# Patient Record
Sex: MALE | Race: OTHER | HISPANIC OR LATINO | URBAN - METROPOLITAN AREA
[De-identification: names, ages, dates, MRNs, and addresses within clinical notes are randomized per-mention and may not be internally consistent; named-entity substitution may affect disease eponyms.]

---

## 2017-02-13 ENCOUNTER — OUTPATIENT (OUTPATIENT)
Dept: OUTPATIENT SERVICES | Facility: HOSPITAL | Age: 42
LOS: 1 days | Discharge: HOME | End: 2017-02-13

## 2017-02-13 ENCOUNTER — APPOINTMENT (OUTPATIENT)
Dept: INTERNAL MEDICINE | Facility: CLINIC | Age: 42
End: 2017-02-13

## 2017-02-13 VITALS
HEIGHT: 68 IN | WEIGHT: 180 LBS | BODY MASS INDEX: 27.28 KG/M2 | HEART RATE: 79 BPM | SYSTOLIC BLOOD PRESSURE: 129 MMHG | DIASTOLIC BLOOD PRESSURE: 71 MMHG

## 2017-02-13 DIAGNOSIS — R10.9 UNSPECIFIED ABDOMINAL PAIN: ICD-10-CM

## 2017-02-13 DIAGNOSIS — Z78.9 OTHER SPECIFIED HEALTH STATUS: ICD-10-CM

## 2017-02-13 DIAGNOSIS — Z00.00 ENCOUNTER FOR GENERAL ADULT MEDICAL EXAMINATION W/OUT ABNORMAL FINDINGS: ICD-10-CM

## 2017-02-17 DIAGNOSIS — E78.1 PURE HYPERGLYCERIDEMIA: ICD-10-CM

## 2017-03-03 ENCOUNTER — CLINICAL ADVICE (OUTPATIENT)
Age: 42
End: 2017-03-03

## 2017-03-03 PROBLEM — E78.1 HYPERTRIGLYCERIDEMIA: Status: ACTIVE | Noted: 2017-03-03

## 2017-03-03 LAB
CHOLEST SERPL-MCNC: 286 MG/DL
ESTIMATED AVERGAGE GLUCOSE (NORTH): 103 MG/DL
HBA1C MFR BLD: 5.2 %
HDLC SERPL-MCNC: 41 MG/DL
HDLC SERPL: 6.98
LDLC SERPL DIRECT ASSAY-MCNC: 119 MG/DL
TRIGL SERPL-MCNC: 494 MG/DL
VLDLC SERPL-MCNC: 98 MG/DL

## 2017-03-06 LAB
H PYLORI AG STL QL IA: NOT DETECTED
SPECIMEN SOURCE: NORMAL

## 2017-03-10 VITALS — WEIGHT: 182 LBS | BODY MASS INDEX: 27.67 KG/M2

## 2017-05-10 ENCOUNTER — APPOINTMENT (OUTPATIENT)
Dept: NUTRITION | Facility: CLINIC | Age: 42
End: 2017-05-10

## 2017-06-27 DIAGNOSIS — Z23 ENCOUNTER FOR IMMUNIZATION: ICD-10-CM

## 2017-06-27 DIAGNOSIS — R10.9 UNSPECIFIED ABDOMINAL PAIN: ICD-10-CM

## 2017-08-10 VITALS — WEIGHT: 185 LBS | BODY MASS INDEX: 28.13 KG/M2

## 2017-09-09 ENCOUNTER — EMERGENCY (EMERGENCY)
Facility: HOSPITAL | Age: 42
LOS: 0 days | Discharge: LEFT BEFORE DISCHARGE | End: 2017-09-09

## 2017-09-09 DIAGNOSIS — F10.129 ALCOHOL ABUSE WITH INTOXICATION, UNSPECIFIED: ICD-10-CM

## 2017-11-08 ENCOUNTER — APPOINTMENT (OUTPATIENT)
Dept: NUTRITION | Facility: CLINIC | Age: 42
End: 2017-11-08

## 2017-11-08 VITALS — WEIGHT: 187 LBS | BODY MASS INDEX: 28.43 KG/M2

## 2018-01-02 ENCOUNTER — APPOINTMENT (OUTPATIENT)
Dept: INTERNAL MEDICINE | Facility: CLINIC | Age: 43
End: 2018-01-02

## 2018-04-24 ENCOUNTER — EMERGENCY (EMERGENCY)
Facility: HOSPITAL | Age: 43
LOS: 0 days | Discharge: HOME | End: 2018-04-25
Attending: EMERGENCY MEDICINE | Admitting: INTERNAL MEDICINE
Payer: MEDICAID

## 2018-04-24 VITALS
TEMPERATURE: 99 F | HEART RATE: 106 BPM | SYSTOLIC BLOOD PRESSURE: 186 MMHG | HEIGHT: 69 IN | DIASTOLIC BLOOD PRESSURE: 104 MMHG | WEIGHT: 197.98 LBS | RESPIRATION RATE: 18 BRPM | OXYGEN SATURATION: 99 %

## 2018-04-24 DIAGNOSIS — R10.9 UNSPECIFIED ABDOMINAL PAIN: ICD-10-CM

## 2018-04-24 DIAGNOSIS — F17.210 NICOTINE DEPENDENCE, CIGARETTES, UNCOMPLICATED: ICD-10-CM

## 2018-04-24 DIAGNOSIS — R07.9 CHEST PAIN, UNSPECIFIED: ICD-10-CM

## 2018-04-24 LAB
BASOPHILS # BLD AUTO: 0.02 K/UL — SIGNIFICANT CHANGE UP (ref 0–0.2)
BASOPHILS NFR BLD AUTO: 0.3 % — SIGNIFICANT CHANGE UP (ref 0–1)
EOSINOPHIL # BLD AUTO: 0.12 K/UL — SIGNIFICANT CHANGE UP (ref 0–0.7)
EOSINOPHIL NFR BLD AUTO: 1.6 % — SIGNIFICANT CHANGE UP (ref 0–8)
HCT VFR BLD CALC: 42.2 % — SIGNIFICANT CHANGE UP (ref 42–52)
HGB BLD-MCNC: 15.6 G/DL — SIGNIFICANT CHANGE UP (ref 14–18)
IMM GRANULOCYTES NFR BLD AUTO: 0.9 % — HIGH (ref 0.1–0.3)
LYMPHOCYTES # BLD AUTO: 2.79 K/UL — SIGNIFICANT CHANGE UP (ref 1.2–3.4)
LYMPHOCYTES # BLD AUTO: 37.4 % — SIGNIFICANT CHANGE UP (ref 20.5–51.1)
MCHC RBC-ENTMCNC: 32.6 PG — HIGH (ref 27–31)
MCHC RBC-ENTMCNC: 37 G/DL — SIGNIFICANT CHANGE UP (ref 32–37)
MCV RBC AUTO: 88.3 FL — SIGNIFICANT CHANGE UP (ref 80–94)
MONOCYTES # BLD AUTO: 0.53 K/UL — SIGNIFICANT CHANGE UP (ref 0.1–0.6)
MONOCYTES NFR BLD AUTO: 7.1 % — SIGNIFICANT CHANGE UP (ref 1.7–9.3)
NEUTROPHILS # BLD AUTO: 3.92 K/UL — SIGNIFICANT CHANGE UP (ref 1.4–6.5)
NEUTROPHILS NFR BLD AUTO: 52.7 % — SIGNIFICANT CHANGE UP (ref 42.2–75.2)
NRBC # BLD: 0 /100 WBCS — SIGNIFICANT CHANGE UP (ref 0–0)
PLATELET # BLD AUTO: 165 K/UL — SIGNIFICANT CHANGE UP (ref 130–400)
RBC # BLD: 4.78 M/UL — SIGNIFICANT CHANGE UP (ref 4.7–6.1)
RBC # FLD: 11.6 % — SIGNIFICANT CHANGE UP (ref 11.5–14.5)
WBC # BLD: 7.45 K/UL — SIGNIFICANT CHANGE UP (ref 4.8–10.8)
WBC # FLD AUTO: 7.45 K/UL — SIGNIFICANT CHANGE UP (ref 4.8–10.8)

## 2018-04-24 RX ORDER — SODIUM CHLORIDE 9 MG/ML
3 INJECTION INTRAMUSCULAR; INTRAVENOUS; SUBCUTANEOUS ONCE
Qty: 0 | Refills: 0 | Status: COMPLETED | OUTPATIENT
Start: 2018-04-24 | End: 2018-04-24

## 2018-04-24 RX ADMIN — SODIUM CHLORIDE 3 MILLILITER(S): 9 INJECTION INTRAMUSCULAR; INTRAVENOUS; SUBCUTANEOUS at 23:55

## 2018-04-24 NOTE — ED PROVIDER NOTE - PROGRESS NOTE DETAILS
I personally evaluated the patient. I reviewed the Resident’s or Physician Assistant’s note (as assigned above), and agree with the findings and plan except as documented in my note.  43yM presents to ED for eval of dizziness and multiple locations of pain.  Pt states that he has a pressure to his chest and pain that radiates into left shoulder, left arm, down to abd and into left leg.  Pt also notes numbness to lips.  These pains are a/w dizziness and a headache.  No LOC.  No nausea, vomiting.  No SOB.  No fever.  No focal weakness.  ON EXAM:  Pt is awake and alert, conversant.  PERRL.  CVS tachycardic, regular.  Resp CTA b/l.  abd soft, nt/nd.  Moving all extremities.  Distal pulses strong and symmetric.  PLAN:  Labs, EKG, CXray, and emergent CT for r/o dissection.  Pt expressed verbal consent and signed form for consent to do CT without screening labs. Pt was interviewed using  [id number]

## 2018-04-24 NOTE — ED PROVIDER NOTE - OBJECTIVE STATEMENT
# [id number]    43 year old male complaining of dizziness and chest pain arm pain and abd pain. patient states that it began with pain that went from his chest to arm to abd to back and than proceeded to have headache and dizziness. No loss of consciousness. no nausea and vomiting, no fever/chills. patient states that he is feeling slightly better since emergency room arrival.

## 2018-04-24 NOTE — ED PROVIDER NOTE - ATTENDING CONTRIBUTION TO CARE
43yM pmhx HLD no meds,  has not seen PMD>1 ear,  + etoh - p/w   left shoulder pain abdominal pain left back pain and left leg pain  x 10 minutes  a/w numbness to b/l lower face no headache dizziness LOC no diaphoresis SOB. no  fam hx of suddne cardiac death or MI,  normal stress test in past nonsmoker  PE: alert nontoxic  cvs tachy cardiac  resp cta   abd soft no pulsatile mass, 2 + radial pulse b/l equal  no le edema.  Alert and oriented.  CN 2-12 intact.  Motor strength and sensory response is symmetric.   Plan ekg labs CTA

## 2018-04-25 ENCOUNTER — TRANSCRIPTION ENCOUNTER (OUTPATIENT)
Age: 43
End: 2018-04-25

## 2018-04-25 VITALS
SYSTOLIC BLOOD PRESSURE: 131 MMHG | TEMPERATURE: 98 F | RESPIRATION RATE: 16 BRPM | DIASTOLIC BLOOD PRESSURE: 70 MMHG | HEART RATE: 77 BPM

## 2018-04-25 DIAGNOSIS — R00.2 PALPITATIONS: ICD-10-CM

## 2018-04-25 DIAGNOSIS — R10.31 RIGHT LOWER QUADRANT PAIN: ICD-10-CM

## 2018-04-25 DIAGNOSIS — Z78.9 OTHER SPECIFIED HEALTH STATUS: ICD-10-CM

## 2018-04-25 DIAGNOSIS — E87.6 HYPOKALEMIA: ICD-10-CM

## 2018-04-25 DIAGNOSIS — R07.9 CHEST PAIN, UNSPECIFIED: ICD-10-CM

## 2018-04-25 PROBLEM — Z00.00 ENCOUNTER FOR PREVENTIVE HEALTH EXAMINATION: Status: ACTIVE | Noted: 2018-04-25

## 2018-04-25 LAB
ALBUMIN SERPL ELPH-MCNC: 4.3 G/DL — SIGNIFICANT CHANGE UP (ref 3.5–5.2)
ALBUMIN SERPL ELPH-MCNC: 4.4 G/DL — SIGNIFICANT CHANGE UP (ref 3.5–5.2)
ALLERGY+IMMUNOLOGY DIAG STUDY NOTE: SIGNIFICANT CHANGE UP
ALP SERPL-CCNC: 115 U/L — SIGNIFICANT CHANGE UP (ref 30–115)
ALP SERPL-CCNC: 144 U/L — HIGH (ref 30–115)
ALT FLD-CCNC: 11 U/L — SIGNIFICANT CHANGE UP (ref 0–41)
ALT FLD-CCNC: 43 U/L — HIGH (ref 0–41)
ANION GAP SERPL CALC-SCNC: 14 MMOL/L — SIGNIFICANT CHANGE UP (ref 7–14)
ANION GAP SERPL CALC-SCNC: 16 MMOL/L — HIGH (ref 7–14)
APTT BLD: 29 SEC — SIGNIFICANT CHANGE UP (ref 27–39.2)
AST SERPL-CCNC: 21 U/L — SIGNIFICANT CHANGE UP (ref 0–41)
AST SERPL-CCNC: 5 U/L — SIGNIFICANT CHANGE UP (ref 0–41)
BILIRUB SERPL-MCNC: 0.3 MG/DL — SIGNIFICANT CHANGE UP (ref 0.2–1.2)
BILIRUB SERPL-MCNC: 0.4 MG/DL — SIGNIFICANT CHANGE UP (ref 0.2–1.2)
BUN SERPL-MCNC: 18 MG/DL — SIGNIFICANT CHANGE UP (ref 10–20)
BUN SERPL-MCNC: 22 MG/DL — HIGH (ref 10–20)
CALCIUM SERPL-MCNC: 9 MG/DL — SIGNIFICANT CHANGE UP (ref 8.5–10.1)
CALCIUM SERPL-MCNC: 9.2 MG/DL — SIGNIFICANT CHANGE UP (ref 8.5–10.1)
CHLORIDE SERPL-SCNC: 100 MMOL/L — SIGNIFICANT CHANGE UP (ref 98–110)
CHLORIDE SERPL-SCNC: 102 MMOL/L — SIGNIFICANT CHANGE UP (ref 98–110)
CK MB CFR SERPL CALC: 1.9 NG/ML — SIGNIFICANT CHANGE UP (ref 0.6–6.3)
CK SERPL-CCNC: 130 U/L — SIGNIFICANT CHANGE UP (ref 0–225)
CK SERPL-CCNC: 132 U/L — SIGNIFICANT CHANGE UP (ref 0–225)
CK SERPL-CCNC: 179 U/L — SIGNIFICANT CHANGE UP (ref 0–225)
CO2 SERPL-SCNC: 24 MMOL/L — SIGNIFICANT CHANGE UP (ref 17–32)
CO2 SERPL-SCNC: 26 MMOL/L — SIGNIFICANT CHANGE UP (ref 17–32)
CREAT SERPL-MCNC: 0.6 MG/DL — LOW (ref 0.7–1.5)
CREAT SERPL-MCNC: <0.5 MG/DL — LOW (ref 0.7–1.5)
ETHANOL SERPL-MCNC: <10 MG/DL — HIGH
GLUCOSE SERPL-MCNC: 102 MG/DL — HIGH (ref 70–99)
GLUCOSE SERPL-MCNC: 121 MG/DL — HIGH (ref 70–99)
HCT VFR BLD CALC: 40 % — LOW (ref 42–52)
HCT VFR BLD CALC: 44 % — SIGNIFICANT CHANGE UP (ref 42–52)
HGB BLD-MCNC: 14.1 G/DL — SIGNIFICANT CHANGE UP (ref 14–18)
HGB BLD-MCNC: 15.6 G/DL — SIGNIFICANT CHANGE UP (ref 14–18)
INR BLD: 1.02 RATIO — SIGNIFICANT CHANGE UP (ref 0.65–1.3)
LIDOCAIN IGE QN: 33 U/L — SIGNIFICANT CHANGE UP (ref 7–60)
LIDOCAIN IGE QN: 41 U/L — SIGNIFICANT CHANGE UP (ref 7–60)
MAGNESIUM SERPL-MCNC: 2.2 MG/DL — SIGNIFICANT CHANGE UP (ref 1.8–2.4)
MCHC RBC-ENTMCNC: 31 PG — SIGNIFICANT CHANGE UP (ref 27–31)
MCHC RBC-ENTMCNC: 31.2 PG — HIGH (ref 27–31)
MCHC RBC-ENTMCNC: 35.3 G/DL — SIGNIFICANT CHANGE UP (ref 32–37)
MCHC RBC-ENTMCNC: 35.5 G/DL — SIGNIFICANT CHANGE UP (ref 32–37)
MCV RBC AUTO: 87.9 FL — SIGNIFICANT CHANGE UP (ref 80–94)
MCV RBC AUTO: 88 FL — SIGNIFICANT CHANGE UP (ref 80–94)
NRBC # BLD: 0 /100 WBCS — SIGNIFICANT CHANGE UP (ref 0–0)
NRBC # BLD: 0 /100 WBCS — SIGNIFICANT CHANGE UP (ref 0–0)
PHOSPHATE SERPL-MCNC: 4.1 MG/DL — SIGNIFICANT CHANGE UP (ref 2.1–4.9)
PLATELET # BLD AUTO: 145 K/UL — SIGNIFICANT CHANGE UP (ref 130–400)
PLATELET # BLD AUTO: 153 K/UL — SIGNIFICANT CHANGE UP (ref 130–400)
POTASSIUM SERPL-MCNC: 3.4 MMOL/L — LOW (ref 3.5–5)
POTASSIUM SERPL-MCNC: 3.7 MMOL/L — SIGNIFICANT CHANGE UP (ref 3.5–5)
POTASSIUM SERPL-SCNC: 3.4 MMOL/L — LOW (ref 3.5–5)
POTASSIUM SERPL-SCNC: 3.7 MMOL/L — SIGNIFICANT CHANGE UP (ref 3.5–5)
PROT SERPL-MCNC: 6.6 G/DL — SIGNIFICANT CHANGE UP (ref 6–8)
PROT SERPL-MCNC: 7.9 G/DL — SIGNIFICANT CHANGE UP (ref 6–8)
PROTHROM AB SERPL-ACNC: 11 SEC — SIGNIFICANT CHANGE UP (ref 9.95–12.87)
RBC # BLD: 4.55 M/UL — LOW (ref 4.7–6.1)
RBC # BLD: 5 M/UL — SIGNIFICANT CHANGE UP (ref 4.7–6.1)
RBC # FLD: 11.6 % — SIGNIFICANT CHANGE UP (ref 11.5–14.5)
RBC # FLD: 11.6 % — SIGNIFICANT CHANGE UP (ref 11.5–14.5)
SODIUM SERPL-SCNC: 140 MMOL/L — SIGNIFICANT CHANGE UP (ref 135–146)
SODIUM SERPL-SCNC: 142 MMOL/L — SIGNIFICANT CHANGE UP (ref 135–146)
TROPONIN T SERPL-MCNC: <0.01 NG/ML — SIGNIFICANT CHANGE UP
TSH SERPL-MCNC: 1.88 UIU/ML — SIGNIFICANT CHANGE UP (ref 0.27–4.2)
TYPE + AB SCN PNL BLD: SIGNIFICANT CHANGE UP
WBC # BLD: 5.7 K/UL — SIGNIFICANT CHANGE UP (ref 4.8–10.8)
WBC # BLD: 6.56 K/UL — SIGNIFICANT CHANGE UP (ref 4.8–10.8)
WBC # FLD AUTO: 5.7 K/UL — SIGNIFICANT CHANGE UP (ref 4.8–10.8)
WBC # FLD AUTO: 6.56 K/UL — SIGNIFICANT CHANGE UP (ref 4.8–10.8)

## 2018-04-25 PROCEDURE — 93880 EXTRACRANIAL BILAT STUDY: CPT | Mod: 26

## 2018-04-25 RX ORDER — FAMOTIDINE 10 MG/ML
20 INJECTION INTRAVENOUS
Qty: 0 | Refills: 0 | Status: DISCONTINUED | OUTPATIENT
Start: 2018-04-25 | End: 2018-04-25

## 2018-04-25 RX ORDER — PANTOPRAZOLE SODIUM 20 MG/1
40 TABLET, DELAYED RELEASE ORAL
Qty: 0 | Refills: 0 | Status: DISCONTINUED | OUTPATIENT
Start: 2018-04-25 | End: 2018-04-25

## 2018-04-25 RX ORDER — POTASSIUM CHLORIDE 20 MEQ
40 PACKET (EA) ORAL ONCE
Qty: 0 | Refills: 0 | Status: COMPLETED | OUTPATIENT
Start: 2018-04-25 | End: 2018-04-25

## 2018-04-25 RX ORDER — SODIUM CHLORIDE 9 MG/ML
1000 INJECTION INTRAMUSCULAR; INTRAVENOUS; SUBCUTANEOUS
Qty: 0 | Refills: 0 | Status: DISCONTINUED | OUTPATIENT
Start: 2018-04-25 | End: 2018-04-25

## 2018-04-25 RX ORDER — OMEPRAZOLE 10 MG/1
1 CAPSULE, DELAYED RELEASE ORAL
Qty: 14 | Refills: 0 | OUTPATIENT
Start: 2018-04-25

## 2018-04-25 RX ORDER — ASPIRIN/CALCIUM CARB/MAGNESIUM 324 MG
81 TABLET ORAL DAILY
Qty: 0 | Refills: 0 | Status: DISCONTINUED | OUTPATIENT
Start: 2018-04-26 | End: 2018-04-25

## 2018-04-25 RX ORDER — ASPIRIN/CALCIUM CARB/MAGNESIUM 324 MG
325 TABLET ORAL ONCE
Qty: 0 | Refills: 0 | Status: COMPLETED | OUTPATIENT
Start: 2018-04-25 | End: 2018-04-25

## 2018-04-25 RX ADMIN — Medication 40 MILLIEQUIVALENT(S): at 16:32

## 2018-04-25 RX ADMIN — SODIUM CHLORIDE 1000 MILLILITER(S): 9 INJECTION INTRAMUSCULAR; INTRAVENOUS; SUBCUTANEOUS at 02:23

## 2018-04-25 RX ADMIN — FAMOTIDINE 100 MILLIGRAM(S): 10 INJECTION INTRAVENOUS at 00:52

## 2018-04-25 RX ADMIN — SODIUM CHLORIDE 1000 MILLILITER(S): 9 INJECTION INTRAMUSCULAR; INTRAVENOUS; SUBCUTANEOUS at 00:52

## 2018-04-25 RX ADMIN — PANTOPRAZOLE SODIUM 40 MILLIGRAM(S): 20 TABLET, DELAYED RELEASE ORAL at 16:32

## 2018-04-25 RX ADMIN — Medication 325 MILLIGRAM(S): at 04:32

## 2018-04-25 NOTE — CHART NOTE - NSCHARTNOTEFT_GEN_A_CORE
addendum to H&P       #      44 yo male with no prior records in [facility] or [facility], with no known PMH other than HLD for which he is on no meds, who presented with left sided body pain chest / back / jaw / abdomen, associated with headache and dizziness and lip numbness with no LOC. He was evaluated in the ER and noted with hypertension and tachycardia. EKG was done showing sinus tach with PVCs. He was sent for emergent CT C/A/P which showed no CTA evidence of aortic dissection, no CT evidence of acute intrathoracic or abdominopelvic pathology, but did show Hepatic steatosis.      focused ROS:      focues exam: VSS      DATA:  hgb 141. from 15.6  ALT slightly abnormal  lipase negative  CE negative x2      PLAN IS AS DELINEATED BY MY COLLEAGUE DR SANTANA. IN ADDITION:    # chest pain: now resolved. was part of a total left sided body symptoms  ----- EKG  - first 2 CE are negative. thrid one going out in the next hour (will send along CBC and A1c)  - check TTE and f/u TSH given c/o palpitations  ----- ? normal stress test in the past      # left sided body pains   ---- now resolved?  -----. concern is associaton with facial numbness and dizziness ? neurologic etiology. patient with no nsurance or PCP   - TTE as above  - tele 24 hrs  ----- CTH / carotid duplex  - ASA 81 pending completion of workup  - check LDL with am labs  - check A1c      ### abd pain RLQ?  ---- ? iv pepcid push prefered if not po  ---- ? diet      # Alcohol use.  Plan: social use but watch for withdrawal symptoms  ---- he should stop due to liver findings on CAT scan      # Coordination of care:  ---- VTE ppx with  ---- does he have a doctor? addrsss? phone #?  - Arranged PCP clinic followup for him May 9th 2:30 at [facility]. Dr Chacko. [phone] addendum to H&P       # [id number]      42 yo male with no prior records in [facility] or [facility], with no known PMH other than HLD for which he is on no meds, who presented with left sided body pain chest / back / jaw / abdomen, associated with headache and dizziness and lip numbness with no LOC. He was evaluated in the ER and noted with hypertension and tachycardia. EKG was done showing sinus tach with PVCs. He was sent for emergent CT C/A/P which showed no CTA evidence of aortic dissection, no CT evidence of acute intrathoracic or abdominopelvic pathology, but did show Hepatic steatosis.    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 he was working at the [employer] and after end of shift he felt unweel. desbrided a a total left body numbness with sesation of pins and needeles, a sensation of perioral numbnmess and dizziness, assoxciated with a sens of panickand a pain to the left shoulder and buyrning of the left abdomen. got worse over the next 10--15 minutes and was associated with a sense of unease and thuis came to the ER.    focused ROS: currently feels well with minimal absd sympx, no other sympx no numbness or weakess or  or sob      focused exam: VSS. AAO. CN 2-12 intact. of note is left pupil is > right pupil but reactive. s1 s2 RRR. CTA BL lungs. abd soft NT ND. LE no edema  moptor 5/5 and sensory symmetric  no drift  toes down      DATA:  hgb 141. from 15.6  ALT slightly abnormal  lipase negative  CE negative x2    FH: no CAD. + PUD    PLAN IS AS DELINEATED BY MY COLLEAGUE DR SANTANA. IN ADDITION:    # chest pain: mostly left shoulder radiating down the arm. now resolved. was part of a total left sided body symptoms. his EKF an tele show frequent ectopy every 2-4 beat. TTE done prelim wnl  - f/u official TTE  - first 2 CE are negative. thrid one going out in the next hour (will send along CBC and A1c)  - f/u TSH  - keep tele and review with CV      # left sided body numbness with perioral numbness. now resolved. mangesium and calcium levels are ok. concern is associaton with facial numbness and dizziness ? neurologic etiology. patient with no insurance or PCP and a prelim eval is warranted as inaptient  - tele 24 hrs  - CTH / carotid duplex  - ASA 81 pending completion of workup  - check LDL, a1c      # abd pain RLQ: resolved. ate today  - DC IV pepcid  - maintain on PPI      # Alcohol use.  Plan: social use but watch for withdrawal symptoms  - he should stop due to liver findings on CAT scan, as advised      # Coordination of care:  - VTE ppx with HSQ  - Arranged PCP clinic followup for him May 9th 2:30 at [facility]. Dr Chacko. [phone]. advised can be seen on a sliding scale fee  - anticipate home tomorrow pending the above evals

## 2018-04-25 NOTE — H&P ADULT - PROBLEM SELECTOR PLAN 4
social use but watch foe withdrawal symptoms (he should stop due to liver findings on CAT scan social use but watch for withdrawal symptoms (he should stop due to liver findings on CAT scan)

## 2018-04-25 NOTE — H&P ADULT - PROBLEM SELECTOR PLAN 3
Cat scan shows normal appendix, continue pepcid for nowand repeat labs Cat scan shows normal appendix, continue pepcid for now and repeat labs

## 2018-04-25 NOTE — DISCHARGE NOTE ADULT - PATIENT PORTAL LINK FT
You can access the [facility] Patient Portal, offered by [facility], by registering with the following website: [email or web address]

## 2018-04-25 NOTE — H&P ADULT - NSHPLABSRESULTS_GEN_ALL_CORE
< from: CT Angio Chest Dissection Protocol (04.24.18 @ 23:57) >      EXAM:  CTA CHEST DISSECTION            PROCEDURE DATE:  04/24/2018      < from: CT Angio Chest Dissection Protocol (04.24.18 @ 23:57) >      IMPRESSION:     1.  No CTA evidence of aortic dissection.    2.  No CT evidence of acute intrathoracic or abdominopelvic pathology.    3.  Hepatic steatosis.    AYO VALLADARES M.D., RESIDENT RADIOLOGIST  This document has been electronically signed.  LILIANA BRITO M.D., ATTENDING RADIOLOGIST  This document has been electronically signed. Apr 25 2018 12:40AM      < end of copied text >                          15.6   7.45  )-----------( 165      ( 24 Apr 2018 23:35 )             42.2     04-24    140  |  100  |  22<H>  ----------------------------<  121<H>  3.4<L>   |  26  |  <0.5<L>    Ca    9.2      24 Apr 2018 23:35  Mg     2.2     04-24    TPro  7.9  /  Alb  4.4  /  TBili  0.3  /  DBili  x   /  AST  5   /  ALT  11  /  AlkPhos  144<H>  04-24              Lactate Trend    CARDIAC MARKERS ( 24 Apr 2018 23:35 )  x     / <0.01 ng/mL / 179 U/L / x     / x          CAPILLARY BLOOD GLUCOSE

## 2018-04-25 NOTE — DISCHARGE NOTE ADULT - CARE PROVIDER_API CALL
Benigno Francisco), Internal Medicine  [address] [place] [id number]  Phone: [phone]  Fax: [phone]    Michael Hoyos), Cardiovascular Disease; Internal Medicine  [address] [place] [address]  Phone: [phone]  Fax: [phone]

## 2018-04-25 NOTE — H&P ADULT - HISTORY OF PRESENT ILLNESS
44yo male presents to the ER due to palpitations and right lower quadrant pain. He denies chest pain at this time 42yo male presents to the ER due to palpitations and right lower quadrant pain lasting for a few minutes at a time. He denies chest pain at this time

## 2018-04-25 NOTE — DISCHARGE NOTE ADULT - CARE PLAN
Principal Discharge DX:	Generalized abdominal pain  Goal:	prevention  Assessment and plan of treatment:	continue to take omeprazole and followup with us in clinic  Secondary Diagnosis:	Other chest pain  Goal:	preventio  Assessment and plan of treatment:	evaluated ruled out dangerous diagnosis such as aortic dissection, pulmonary mboliasm, or heart attack. evaluation did show plaque in the blood vessels of the neck. you will need to get your cholesterol checked and may need to start cholesterol medications

## 2018-04-25 NOTE — DISCHARGE NOTE ADULT - CARE PROVIDERS DIRECT ADDRESSES
,[email or web address]

## 2018-04-25 NOTE — DISCHARGE NOTE ADULT - MEDICATION SUMMARY - MEDICATIONS TO TAKE
I will START or STAY ON the medications listed below when I get home from the hospital:    omeprazole 20 mg oral delayed release capsule  -- 1 cap(s) by mouth once a day   -- It is very important that you take or use this exactly as directed.  Do not skip doses or discontinue unless directed by your doctor.  Obtain medical advice before taking any non-prescription drugs as some may affect the action of this medication.  Swallow whole.  Do not crush.    -- Indication: For Abdominal pain

## 2018-04-25 NOTE — DISCHARGE NOTE ADULT - PLAN OF CARE
evaluated ruled out dangerous diagnosis such as aortic dissection, pulmonary mboliasm, or heart attack. evaluation did show plaque in the blood vessels of the neck. you will need to get your cholesterol checked and may need to start cholesterol medications prevention continue to take omeprazole and followup with us in clinic preventio

## 2018-04-25 NOTE — DISCHARGE NOTE ADULT - HOSPITAL COURSE
42 yo male with PMH DLD, who presented with left sided body pain chest / back / jaw / abdomen, associated with headache and dizziness and lip numbness with no LOC. all of whcih rsesolved. he was ruled out for CVA, acute MI, PE, PNA, and aortic dissection. he was found wtith asymptmatic PVC, but a normal echo    He will need followup with primary care phsycian ,which was arranged as above. advised will also need to see cardiology for and outpatient stress test and a holter monitor

## 2018-04-26 LAB
ESTIMATED AVERAGE GLUCOSE: 105 MG/DL — SIGNIFICANT CHANGE UP (ref 68–114)
HBA1C BLD-MCNC: 5.3 % — SIGNIFICANT CHANGE UP (ref 4–5.6)

## 2018-05-09 ENCOUNTER — APPOINTMENT (OUTPATIENT)
Dept: INTERNAL MEDICINE | Facility: HOSPITAL | Age: 43
End: 2018-05-09

## 2018-05-15 ENCOUNTER — APPOINTMENT (OUTPATIENT)
Dept: NUTRITION | Facility: CLINIC | Age: 43
End: 2018-05-15

## 2018-05-15 VITALS — HEIGHT: 68 IN | WEIGHT: 182 LBS | BODY MASS INDEX: 27.58 KG/M2

## 2019-03-26 NOTE — ED PROVIDER NOTE - NS_ATTENDINGSCRIBE_ED_ALL_ED
I personally performed the service described in the documentation recorded by the scribe in my presence, and it accurately and completely records my words and actions. hydrocele L/visibly enlarged under clothing
